# Patient Record
Sex: MALE | Race: WHITE | Employment: UNEMPLOYED | URBAN - METROPOLITAN AREA
[De-identification: names, ages, dates, MRNs, and addresses within clinical notes are randomized per-mention and may not be internally consistent; named-entity substitution may affect disease eponyms.]

---
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
## 2025-05-31 ENCOUNTER — HOSPITAL ENCOUNTER (EMERGENCY)
Facility: CLINIC | Age: 20
Discharge: HOME | End: 2025-05-31
Attending: EMERGENCY MEDICINE | Admitting: EMERGENCY MEDICINE
Payer: COMMERCIAL

## 2025-05-31 VITALS
OXYGEN SATURATION: 97 % | HEART RATE: 86 BPM | RESPIRATION RATE: 19 BRPM | SYSTOLIC BLOOD PRESSURE: 125 MMHG | TEMPERATURE: 98.8 F | DIASTOLIC BLOOD PRESSURE: 74 MMHG

## 2025-05-31 DIAGNOSIS — J02.9 PHARYNGITIS, UNSPECIFIED ETIOLOGY: ICD-10-CM

## 2025-05-31 LAB
FLUAV RNA SPEC QL NAA+PROBE: NEGATIVE
FLUBV RNA RESP QL NAA+PROBE: NEGATIVE
RSV RNA SPEC NAA+PROBE: NEGATIVE
S PYO DNA THROAT QL NAA+PROBE: NOT DETECTED
SARS-COV-2 RNA RESP QL NAA+PROBE: NEGATIVE

## 2025-05-31 PROCEDURE — 87637 SARSCOV2&INF A&B&RSV AMP PRB: CPT | Performed by: EMERGENCY MEDICINE

## 2025-05-31 PROCEDURE — 99283 EMERGENCY DEPT VISIT LOW MDM: CPT | Performed by: EMERGENCY MEDICINE

## 2025-05-31 PROCEDURE — 250N000012 HC RX MED GY IP 250 OP 636 PS 637: Performed by: EMERGENCY MEDICINE

## 2025-05-31 PROCEDURE — 87651 STREP A DNA AMP PROBE: CPT | Performed by: EMERGENCY MEDICINE

## 2025-05-31 RX ORDER — AMOXICILLIN 500 MG/1
1000 CAPSULE ORAL 2 TIMES DAILY
Qty: 28 CAPSULE | Refills: 0 | Status: SHIPPED | OUTPATIENT
Start: 2025-05-31 | End: 2025-06-07

## 2025-05-31 RX ADMIN — DEXAMETHASONE 10 MG: 2 TABLET ORAL at 05:14

## 2025-05-31 ASSESSMENT — ACTIVITIES OF DAILY LIVING (ADL)
ADLS_ACUITY_SCORE: 41
ADLS_ACUITY_SCORE: 41

## 2025-05-31 ASSESSMENT — COLUMBIA-SUICIDE SEVERITY RATING SCALE - C-SSRS
6. HAVE YOU EVER DONE ANYTHING, STARTED TO DO ANYTHING, OR PREPARED TO DO ANYTHING TO END YOUR LIFE?: NO
1. IN THE PAST MONTH, HAVE YOU WISHED YOU WERE DEAD OR WISHED YOU COULD GO TO SLEEP AND NOT WAKE UP?: NO
2. HAVE YOU ACTUALLY HAD ANY THOUGHTS OF KILLING YOURSELF IN THE PAST MONTH?: NO

## 2025-05-31 NOTE — ED TRIAGE NOTES
Pt reporting having a sore throat, fever, and cough since Sunday. Ibuprofen taken 0400.      Triage Assessment (Adult)       Row Name 05/31/25 0451          Triage Assessment    Airway WDL WDL        Respiratory WDL    Respiratory WDL X;cough     Cough Frequency frequent        Skin Circulation/Temperature WDL    Skin Circulation/Temperature WDL WDL        Cardiac WDL    Cardiac WDL WDL        Peripheral/Neurovascular WDL    Peripheral Neurovascular WDL WDL        Cognitive/Neuro/Behavioral WDL    Cognitive/Neuro/Behavioral WDL WDL

## 2025-05-31 NOTE — ED PROVIDER NOTES
EMERGENCY DEPARTMENT ENCOUNTER      NAME: Tera Johnson  AGE: 20 year old male  YOB: 2005  MRN: [id number]  EVALUATION DATE & TIME: 5/31/2025  4:56 AM    PCP: System, Provider Not In    ED PROVIDER: Asher Davis M.D.      Chief Complaint   Patient presents with    Pharyngitis    Cough         FINAL IMPRESSION:  1. Pharyngitis, unspecified etiology          ED COURSE & MEDICAL DECISION MAKING:    Pertinent Labs & Imaging studies reviewed. (See chart for details)  20 year old male presents to the Emergency Department for evaluation of sore throat.  Has pharyngitis.  Strep is negative COVID influenza is negative.  Uvula is midline.  However he has extremely large tonsils.  There are tonsillar stones noted.  Given a dose of Decadron here.  I do not see any signs of peritonsillar abscess.  Given the severity of his pharyngitis I will actually put him on amoxicillin despite the negative strep as this could be a false negative.  He will return for any worsening symptoms.  Follow-up with primary.  No airway issues    5:05 AM I met with the patient to gather history and to perform my initial exam. I discussed the plan for care while in the Emergency Department.       At the conclusion of the encounter I discussed the results of all of the tests and the disposition. The questions were answered. The patient or family acknowledged understanding and was agreeable with the care plan.     Medical Decision Making    Discharge. I prescribed additional prescription strength medication(s) as charted. See documentation for any additional details.    MIPS (CTPE, Dental pain, Jose, Sinusitis, Asthma/COPD, Head Trauma): Not Applicable    SEPSIS: None              MEDICATIONS GIVEN IN THE EMERGENCY:  Medications   dexAMETHasone (DECADRON) tablet 10 mg (10 mg Oral $Given 5/31/25 0514)       NEW PRESCRIPTIONS STARTED AT TODAY'S ER VISIT  New Prescriptions    AMOXICILLIN (AMOXIL) 500 MG CAPSULE    Take 2 capsules (1,000  "mg) by mouth 2 times daily for 7 days.          =================================================================    HPI    Patient information was obtained from: patient and mother    Use of : N/A        Tera Johnson is a 20 year old male with a pertinent history of pharyngitis who presents to this ED for evaluation of pharyngitis and cough.    Patient has a sore throat which started 5 days ago and is \"so bad it hurts to do anything\". Has been progressively getting worse and he could not sleep this morning. Has a cough as well and some shortness of breath. Took ibuprofen 1 hour ago. Denies congestion, sick contacts, or recent travel.    Chart Review:  - [facility] [place] on 5/28/25 (3 days ago). His molecular strep test today is negative. There is no clinical evidence of peritonsillar abscess, retropharyngeal abscess, or epiglottitis. I have recommended treatment with symptomatic care and a dose of Decadron that was given here in the [facility].     PAST MEDICAL HISTORY:  History reviewed. No pertinent past medical history.    PAST SURGICAL HISTORY:  History reviewed. No pertinent surgical history.        CURRENT MEDICATIONS:    No current facility-administered medications for this encounter.     Current Outpatient Medications   Medication Sig Dispense Refill    amoxicillin (AMOXIL) 500 MG capsule Take 2 capsules (1,000 mg) by mouth 2 times daily for 7 days. 28 capsule 0    Pediatric Multivit-Minerals-C (GUMMI BEAR MULTIVITAMIN  /MIN) CHEW Take 2 chew tab by mouth daily.           ALLERGIES:  No Known Allergies    FAMILY HISTORY:  History reviewed. No pertinent family history.    SOCIAL HISTORY:   Social History     Socioeconomic History    Marital status: Single   Tobacco Use    Smoking status: Never    Smokeless tobacco: Never     Social Drivers of Health      Received from [facility]    Financial Resource Strain    Received from [employer] [facility]    Social Connections       VITALS:  BP (!) 141/79   Pulse 96   Temp 98.8  F (37.1  C) (Temporal)   Resp 19   SpO2 96%     PHYSICAL EXAM    Physical Exam  Vitals and nursing note reviewed.   Constitutional:       General: He is not in acute distress.     Appearance: He is not diaphoretic.   HENT:      Head: Atraumatic.      Mouth/Throat:      Pharynx: Uvula midline. Pharyngeal swelling, oropharyngeal exudate and posterior oropharyngeal erythema present.      Tonsils: Tonsillar exudate present. No tonsillar abscesses. 2+ on the right. 2+ on the left.   Eyes:      General: No scleral icterus.     Pupils: Pupils are equal, round, and reactive to light.   Cardiovascular:      Rate and Rhythm: Normal rate and regular rhythm.      Heart sounds: Normal heart sounds.   Pulmonary:      Effort: No respiratory distress.      Breath sounds: Normal breath sounds.   Abdominal:      Palpations: Abdomen is soft.      Tenderness: There is no abdominal tenderness.   Musculoskeletal:         General: No tenderness.   Lymphadenopathy:      Cervical: Cervical adenopathy present.   Skin:     General: Skin is warm.      Findings: No rash.           LAB:  All pertinent labs reviewed and interpreted.  Labs Ordered and Resulted from Time of ED Arrival to Time of ED Departure   INFLUENZA A/B, RSV AND SARS-COV2 PCR - Normal       Result Value    Influenza A PCR Negative      Influenza B PCR Negative      RSV PCR Negative      SARS CoV2 PCR Negative     GROUP A STREPTOCOCCUS PCR THROAT SWAB - Normal    Group A strep by PCR Not Detected         RADIOLOGY:  Reviewed all pertinent imaging. Please see official radiology report.  No orders to display       PROCEDURES:   None      I, Rubén Thomas, am serving as a scribe to document services personally performed by Dr. Asher Davis, based on my observation and the provider's statements to me. I, Asher Davis MD attest that Rubén Thomas is acting in a scribe capacity,  has observed my performance of the services and has documented them in accordance with my direction.    Asher Davis M.D.  Emergency Medicine  [facility] EMERGENCY ROOM  [address] [address]  [phone]  Dept: [phone]       Asher Davis MD  05/31/25 0616